# Patient Record
Sex: MALE | Race: OTHER | HISPANIC OR LATINO | URBAN - METROPOLITAN AREA
[De-identification: names, ages, dates, MRNs, and addresses within clinical notes are randomized per-mention and may not be internally consistent; named-entity substitution may affect disease eponyms.]

---

## 2019-04-10 ENCOUNTER — EMERGENCY (EMERGENCY)
Facility: HOSPITAL | Age: 39
LOS: 0 days | Discharge: HOME | End: 2019-04-10
Attending: EMERGENCY MEDICINE | Admitting: EMERGENCY MEDICINE
Payer: COMMERCIAL

## 2019-04-10 VITALS
OXYGEN SATURATION: 100 % | RESPIRATION RATE: 14 BRPM | TEMPERATURE: 98 F | WEIGHT: 184.97 LBS | HEIGHT: 69 IN | DIASTOLIC BLOOD PRESSURE: 83 MMHG | HEART RATE: 68 BPM | SYSTOLIC BLOOD PRESSURE: 182 MMHG

## 2019-04-10 DIAGNOSIS — M54.9 DORSALGIA, UNSPECIFIED: ICD-10-CM

## 2019-04-10 PROCEDURE — 99283 EMERGENCY DEPT VISIT LOW MDM: CPT

## 2019-04-10 RX ORDER — LIDOCAINE 4 G/100G
2 CREAM TOPICAL ONCE
Qty: 0 | Refills: 0 | Status: COMPLETED | OUTPATIENT
Start: 2019-04-10 | End: 2019-04-10

## 2019-04-10 RX ORDER — ACETAMINOPHEN 500 MG
1000 TABLET ORAL ONCE
Qty: 0 | Refills: 0 | Status: COMPLETED | OUTPATIENT
Start: 2019-04-10 | End: 2019-04-10

## 2019-04-10 RX ORDER — KETOROLAC TROMETHAMINE 30 MG/ML
30 SYRINGE (ML) INJECTION ONCE
Qty: 0 | Refills: 0 | Status: DISCONTINUED | OUTPATIENT
Start: 2019-04-10 | End: 2019-04-10

## 2019-04-10 RX ORDER — DIAZEPAM 5 MG
5 TABLET ORAL ONCE
Qty: 0 | Refills: 0 | Status: DISCONTINUED | OUTPATIENT
Start: 2019-04-10 | End: 2019-04-10

## 2019-04-10 RX ADMIN — Medication 30 MILLIGRAM(S): at 18:51

## 2019-04-10 RX ADMIN — LIDOCAINE 2 PATCH: 4 CREAM TOPICAL at 18:50

## 2019-04-10 RX ADMIN — Medication 1000 MILLIGRAM(S): at 18:51

## 2019-04-10 RX ADMIN — Medication 5 MILLIGRAM(S): at 18:51

## 2019-04-10 NOTE — ED PROVIDER NOTE - NS_ ATTENDINGSCRIBEDETAILS _ED_A_ED_FT
The scribe's documentation has been prepared under my direction and personally reviewed by me in its entirety. I confirm that the note above accurately reflects all work, treatment, procedures, and medical decision-making performed by me.  Yony Santillan MD

## 2019-04-10 NOTE — ED ADULT NURSE NOTE - NSIMPLEMENTINTERV_GEN_ALL_ED
Implemented All Fall Risk Interventions:  [place] to call system. Call bell, personal items and telephone within reach. Instruct patient to call for assistance. Room bathroom lighting operational. Non-slip footwear when patient is off stretcher. Physically safe environment: no spills, clutter or unnecessary equipment. Stretcher in lowest position, wheels locked, appropriate side rails in place. Provide visual cue, wrist band, yellow gown, etc. Monitor gait and stability. Monitor for mental status changes and reorient to person, place, and time. Review medications for side effects contributing to fall risk. Reinforce activity limits and safety measures with patient and family.

## 2019-04-10 NOTE — ED PROVIDER NOTE - NSFOLLOWUPINSTRUCTIONS_ED_ALL_ED_FT
Back Pain    Back pain is very common in adults. The cause of back pain is rarely dangerous and the pain often gets better over time. The cause of your back pain may not be known and may include strain of muscles or ligaments, degeneration of the spinal disks, or arthritis. Occasionally the pain may radiate down your leg(s). Over-the-counter medicines to reduce pain and inflammation are often the most helpful. Stretching and remaining active frequently helps the healing process.     SEEK IMMEDIATE MEDICAL CARE IF YOU HAVE ANY OF THE FOLLOWING SYMPTOMS: bowel or bladder control problems, unusual weakness or numbness in your arms or legs, nausea or vomiting, abdominal pain, fever, dizziness/lightheadedness.    Please follow with your PMD in 2-3 days.

## 2019-04-10 NOTE — ED PROVIDER NOTE - OBJECTIVE STATEMENT
37 y/o male with no relevant PMHx presents to the ED c/o bilateral back pain since 11 am this morning. Pt states that he was laying in bed when he began to get up and start to feel the pain. Pt states that he has been traveling a lot over the past few days. Has not taken anything yet for pain. Has been hospitalized before for back pain. Denies any numbness or tingling in legs, no bladder or bowel incontinence. 37 y/o male with no relevant PMHx presents to the ED c/o bilateral back pain since 11 am this morning. Pt states that he was laying in bed when he began to get up and start to feel the pain. Pt states that he has been traveling a lot over the past few days. Has not taken anything yet for pain. Notes hx of similar pain in past.  Denies any numbness or tingling in legs, no bladder or bowel incontinence, fever, CP, SOB, or recent trauma.

## 2019-04-10 NOTE — ED PROVIDER NOTE - PHYSICAL EXAMINATION
***GEN - NAD; well appearing; A+O x3   ***PULMONARY - CTA b/l, symmetric breath sounds. ***CARDIAC -s1s2, RRR, no M,G,R  ***ABDOMEN - ND, NT, soft, no guarding, no rebound, no remy's   ***SKIN - no rash or bruising   ***NEUROLOGIC - alert and oriented, follows commands, sensation nl, motor nl,   ***PSYCH - insight and judgment nl, memory nl, affect nl, thought nl   ***MSK: No midline spinal TTP.+ lower lumbar paraspinal TTP. ***GEN - NAD; well appearing; A+O x3   ***PULMONARY - CTA b/l, symmetric breath sounds. ***CARDIAC -s1s2, RRR, no M,G,R  ***ABDOMEN - ND, NT, soft, no guarding, no rebound, no remy's   ***SKIN - no rash or bruising   ***NEUROLOGIC - alert and oriented, follows commands, sensation nl, motor nl,   ***PSYCH - insight and judgment nl, memory nl, affect nl, thought nl   ***MSK: No midline spinal TTP.+ lower lumbar paraspinal TTP bilateral.    +SLR on R
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
## 2019-11-19 ENCOUNTER — EMERGENCY (EMERGENCY)
Facility: HOSPITAL | Age: 39
LOS: 0 days | Discharge: HOME | End: 2019-11-19
Attending: EMERGENCY MEDICINE
Payer: COMMERCIAL

## 2019-11-19 VITALS
OXYGEN SATURATION: 100 % | DIASTOLIC BLOOD PRESSURE: 85 MMHG | RESPIRATION RATE: 18 BRPM | SYSTOLIC BLOOD PRESSURE: 123 MMHG | TEMPERATURE: 98 F | HEART RATE: 73 BPM

## 2019-11-19 VITALS — WEIGHT: 179.9 LBS | HEIGHT: 67 IN

## 2019-11-19 DIAGNOSIS — M54.5 LOW BACK PAIN: ICD-10-CM

## 2019-11-19 DIAGNOSIS — M54.16 RADICULOPATHY, LUMBAR REGION: ICD-10-CM

## 2019-11-19 PROCEDURE — 99284 EMERGENCY DEPT VISIT MOD MDM: CPT | Mod: 25

## 2019-11-19 PROCEDURE — 96365 THER/PROPH/DIAG IV INF INIT: CPT

## 2019-11-19 PROCEDURE — 99284 EMERGENCY DEPT VISIT MOD MDM: CPT

## 2019-11-19 PROCEDURE — 96375 TX/PRO/DX INJ NEW DRUG ADDON: CPT

## 2019-11-19 RX ORDER — METHOCARBAMOL 500 MG/1
1500 TABLET, FILM COATED ORAL ONCE
Refills: 0 | Status: COMPLETED | OUTPATIENT
Start: 2019-11-19 | End: 2019-11-19

## 2019-11-19 RX ORDER — SODIUM CHLORIDE 9 MG/ML
3 INJECTION INTRAMUSCULAR; INTRAVENOUS; SUBCUTANEOUS ONCE
Refills: 0 | Status: COMPLETED | OUTPATIENT
Start: 2019-11-19 | End: 2019-11-19

## 2019-11-19 RX ORDER — KETOROLAC TROMETHAMINE 30 MG/ML
30 SYRINGE (ML) INJECTION ONCE
Refills: 0 | Status: DISCONTINUED | OUTPATIENT
Start: 2019-11-19 | End: 2019-11-19

## 2019-11-19 RX ORDER — IBUPROFEN 200 MG
1 TABLET ORAL
Qty: 28 | Refills: 0
Start: 2019-11-19 | End: 2019-11-25

## 2019-11-19 RX ORDER — DEXAMETHASONE 0.5 MG/5ML
8 ELIXIR ORAL ONCE
Refills: 0 | Status: COMPLETED | OUTPATIENT
Start: 2019-11-19 | End: 2019-11-19

## 2019-11-19 RX ORDER — METHOCARBAMOL 500 MG/1
2 TABLET, FILM COATED ORAL
Qty: 24 | Refills: 0
Start: 2019-11-19 | End: 2019-11-21

## 2019-11-19 RX ORDER — LIDOCAINE 4 G/100G
1 CREAM TOPICAL ONCE
Refills: 0 | Status: COMPLETED | OUTPATIENT
Start: 2019-11-19 | End: 2019-11-19

## 2019-11-19 RX ADMIN — LIDOCAINE 1 PATCH: 4 CREAM TOPICAL at 10:55

## 2019-11-19 RX ADMIN — Medication 30 MILLIGRAM(S): at 11:54

## 2019-11-19 RX ADMIN — Medication 101.6 MILLIGRAM(S): at 11:15

## 2019-11-19 RX ADMIN — Medication 8 MILLIGRAM(S): at 11:54

## 2019-11-19 RX ADMIN — Medication 30 MILLIGRAM(S): at 10:55

## 2019-11-19 RX ADMIN — SODIUM CHLORIDE 3 MILLILITER(S): 9 INJECTION INTRAMUSCULAR; INTRAVENOUS; SUBCUTANEOUS at 10:55

## 2019-11-19 RX ADMIN — METHOCARBAMOL 1500 MILLIGRAM(S): 500 TABLET, FILM COATED ORAL at 10:55
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
## 2019-11-19 NOTE — ED STATDOCS - CLINICAL SUMMARY MEDICAL DECISION MAKING FREE TEXT BOX
Pt with acute sciatica, will treat with antiinflammatory, Lidoderm, muscle relaxer, steroids, reevaluate.
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
## 2019-11-19 NOTE — ED STATDOCS - ATTENDING CONTRIBUTION TO CARE
I, Teresa Deal, performed the initial face to face bedside interview with this patient regarding history of present illness, review of symptoms and relevant past medical, social and family history.  I completed an independent physical examination.  I was the initial provider who evaluated this patient. I have signed out the follow up of any pending tests (i.e. labs, radiological studies) to the ACP.  I have communicated the patient’s plan of care and disposition with the ACP.  The history, relevant review of systems, past medical and surgical history, medical decision making, and physical examination was documented by the scribe in my presence and I attest to the accuracy of the documentation.

## 2019-11-19 NOTE — ED STATDOCS - PATIENT PORTAL LINK FT
You can access the FollowMyHealth Patient Portal offered by [facility] by registering at the following website: [email or web address]. By joining [employer]’s FollowMyHealth portal, you will also be able to view your health information using other applications (apps) compatible with our system.
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
## 2019-11-19 NOTE — ED STATDOCS - MUSCULOSKELETAL, MLM
+TTP in sacrum, no stepoff or deformity of thoracic lumbar region. +some flattening of lumbar lordosis
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
## 2019-11-19 NOTE — ED STATDOCS - CARE PROVIDER_API CALL
Jose L Mcmahon; PhD)  Neurosurgery  [address], [address] [place] [address]  Phone: [phone]  Fax: [phone]  Follow Up Time:

## 2021-06-11 ENCOUNTER — EMERGENCY (EMERGENCY)
Facility: HOSPITAL | Age: 41
LOS: 0 days | Discharge: HOME | End: 2021-06-11
Attending: EMERGENCY MEDICINE
Payer: COMMERCIAL

## 2021-06-11 VITALS
OXYGEN SATURATION: 98 % | DIASTOLIC BLOOD PRESSURE: 86 MMHG | RESPIRATION RATE: 18 BRPM | TEMPERATURE: 98 F | SYSTOLIC BLOOD PRESSURE: 143 MMHG | HEART RATE: 72 BPM

## 2021-06-11 VITALS — HEIGHT: 67 IN | WEIGHT: 177.91 LBS

## 2021-06-11 DIAGNOSIS — S90.31XA CONTUSION OF RIGHT FOOT, INITIAL ENCOUNTER: ICD-10-CM

## 2021-06-11 DIAGNOSIS — M79.89 OTHER SPECIFIED SOFT TISSUE DISORDERS: ICD-10-CM
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
## 2021-06-11 DIAGNOSIS — S80.11XA CONTUSION OF RIGHT LOWER LEG, INITIAL ENCOUNTER: ICD-10-CM

## 2021-06-11 DIAGNOSIS — S90.01XA CONTUSION OF RIGHT ANKLE, INITIAL ENCOUNTER: ICD-10-CM

## 2021-06-11 DIAGNOSIS — M79.661 PAIN IN RIGHT LOWER LEG: ICD-10-CM
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
## 2021-06-11 PROBLEM — M54.9 DORSALGIA, UNSPECIFIED: Chronic | Status: ACTIVE | Noted: 2019-11-19

## 2021-06-11 PROCEDURE — 93971 EXTREMITY STUDY: CPT | Mod: 26,RT

## 2021-06-11 PROCEDURE — 99284 EMERGENCY DEPT VISIT MOD MDM: CPT | Mod: 25

## 2021-06-11 PROCEDURE — 93971 EXTREMITY STUDY: CPT | Mod: RT

## 2021-06-11 PROCEDURE — 99284 EMERGENCY DEPT VISIT MOD MDM: CPT

## 2021-06-11 NOTE — ED ADULT NURSE NOTE - NSIMPLEMENTINTERV_GEN_ALL_ED
Implemented All Universal Safety Interventions:  [place] to call system. Call bell, personal items and telephone within reach. Instruct patient to call for assistance. Room bathroom lighting operational. Non-slip footwear when patient is off stretcher. Physically safe environment: no spills, clutter or unnecessary equipment. Stretcher in lowest position, wheels locked, appropriate side rails in place.

## 2021-06-11 NOTE — ED STATDOCS - PATIENT PORTAL LINK FT
You can access the FollowMyHealth Patient Portal offered by [facility] by registering at the following website: [email or web address]. By joining [employer]’s FollowMyHealth portal, you will also be able to view your health information using other applications (apps) compatible with our system.

## 2021-06-11 NOTE — ED STATDOCS - NSFOLLOWUPINSTRUCTIONS_ED_ALL_ED_FT
Please follow up with your Primary MD in 2-3 days and your orthopedic within one week. Return to ED immediately for any new or concerning symptoms or worsening symptoms    HEMATOMA - AfterCare(R) Instructions(ER/ED)           Hematoma    WHAT YOU NEED TO KNOW:    A hematoma is a collection of blood. A bruise is a type of hematoma. A hematoma may form in a muscle or in the tissues just under the skin. A hematoma that forms under the skin will feel like a bump or hard mass. Hematomas can happen anywhere in your body, including in your brain. Your body may break down and absorb a mild hematoma on its own. A more serious hematoma may need treatment.     DISCHARGE INSTRUCTIONS:    Medicines: You may need any of the following:   •Prescription pain medicine may be given. Ask how to take this medicine safely.      •NSAIDs, such as ibuprofen, help decrease swelling, pain, and fever. This medicine is available with or without a doctor's order. NSAIDs can cause stomach bleeding or kidney problems in certain people. If you take blood thinner medicine, always ask your healthcare provider if NSAIDs are safe for you. Always read the medicine label and follow directions.      •Antibiotics prevent or treat a bacterial infection.      •Take your medicine as directed. Contact your healthcare provider if you think your medicine is not helping or if you have side effects. Tell him of her if you are allergic to any medicine. Keep a list of the medicines, vitamins, and herbs you take. Include the amounts, and when and why you take them. Bring the list or the pill bottles to follow-up visits. Carry your medicine list with you in case of an emergency.      Return to the emergency department if:   •You have new or worsening pain, or pain that does not get better with medicine.      •You have a fever.      •You have trouble moving the body part that has the hematoma.      Contact your healthcare provider if:   •You have questions or concerns about your condition or care.          Follow up with your healthcare provider as directed: You may need to have surgery if your hematoma is severe. You may also need other tests to make sure there is no other damage that needs to be treated. Write down your questions so you remember to ask them during your visits.    Self-care:   •Rest the area. Rest will help your body heal and will also help prevent more damage.      •Apply ice as directed. Ice helps reduce swelling. Ice may also help prevent tissue damage. Use an ice pack, or put crushed ice in a bag. Cover it with a towel. Place it on your hematoma for 20 minutes every hour, or as directed. Ask how many times each day to apply ice, and for how many days.      •Compress the injury if possible. Lightly wrap the injury with an elastic or soft bandage. This may help control swelling. Ask your healthcare provider how to wrap your injury properly.       •Elevate the area as directed. If possible, raise the area above the level of your heart as often as you can. This will help decrease swelling.       •Keep the hematoma covered with a bandage. This will help protect the area while it heals.

## 2021-06-11 NOTE — ED STATDOCS - PROGRESS NOTE DETAILS
Results reviewed and discussed with pt. Recommend RICE therapy and follow back up with ortho. Discussed importance of close FU with PMD. Pt asked to return to ED immediately for any new or concerning sx or worsening. Pt acknowledges and understands plan -Lucio Wei PA-C 41 y/o M presents with R calf pain x 1 week. pt states when walking he suddenly felt pain to his calf. Pt has seen ortho given supportive measures, but is still having pain. Denies fever/chills, n/v/d, Cp, SOB, abd pain or other complaints at this time. -Lucio Wei PA-C

## 2021-06-11 NOTE — ED STATDOCS - OBJECTIVE STATEMENT
39 y/o male with a PMHx of chronic back pain presents to the ED c/o right lower leg pain and swelling x1 week. Pt states he was walking when he felt a sudden pain in his right calf. Then developed swelling to right leg and ankle. Followed up with ortho, doppler at that time showed no DVT. They told pt to take Advil, compress leg, ice and elevate and follow up in 2 weeks. Ortho did not order MRI. Now pt has more bruising and swelling in ankle, was concerned that it is not improving so came into ED. Reports that he has been elevating his leg as much as possible but it's hard because he works.

## 2021-06-11 NOTE — ED ADULT NURSE NOTE - OBJECTIVE STATEMENT
patient presents to the ED c/o right lower leg pain and swelling x1 week. Pt states he was walking when he felt a sudden pain in his right calf. Then developed swelling to right leg and ankle. Followed up with ortho, doppler at that time showed no DVT. They told pt to take Advil, compress leg, ice and elevate and follow up in 2 weeks. Ortho did not order MRI. Now pt has more bruising and swelling in ankle, was concerned that it is not improving so came into ED. Reports that he has been elevating his leg as much as possible but it's hard because he works.

## 2021-06-11 NOTE — ED ADULT TRIAGE NOTE - CHIEF COMPLAINT QUOTE
c/o right calf injury while walking with machine at work, patient states he was not overly exerted himself at time, calf became swollen, injury occurred 5 days ago, went to urgent care, had sono which was negative for blood clot, c/o increased pain and swelling going down to right ankle, ecchymosis noted to right ankle, unable to bare weight HX: chronic shoulder pain

## 2023-11-22 NOTE — ED ADULT NURSE NOTE - PAIN RATING/NUMBER SCALE (0-10): ACTIVITY
5
PAST MEDICAL HISTORY:  History of migraine headaches     Knee pain     Pulsatile tinnitus     Seasonal allergies
